# Patient Record
Sex: MALE
[De-identification: names, ages, dates, MRNs, and addresses within clinical notes are randomized per-mention and may not be internally consistent; named-entity substitution may affect disease eponyms.]

---

## 2018-09-19 ENCOUNTER — HOSPITAL ENCOUNTER (EMERGENCY)
Dept: HOSPITAL 88 - ER | Age: 33
Discharge: HOME | End: 2018-09-19
Payer: COMMERCIAL

## 2018-09-19 VITALS — WEIGHT: 290 LBS | HEIGHT: 70 IN | BODY MASS INDEX: 41.52 KG/M2

## 2018-09-19 VITALS — DIASTOLIC BLOOD PRESSURE: 73 MMHG | SYSTOLIC BLOOD PRESSURE: 123 MMHG

## 2018-09-19 DIAGNOSIS — R10.32: Primary | ICD-10-CM

## 2018-09-19 DIAGNOSIS — R10.84: ICD-10-CM

## 2018-09-19 LAB
ALBUMIN SERPL-MCNC: 4.1 G/DL (ref 3.5–5)
ALBUMIN/GLOB SERPL: 1.1 {RATIO} (ref 0.8–2)
ALP SERPL-CCNC: 79 IU/L (ref 40–150)
ALT SERPL-CCNC: 83 IU/L (ref 0–55)
ANION GAP SERPL CALC-SCNC: 15.9 MMOL/L (ref 8–16)
BACTERIA URNS QL MICRO: (no result) /HPF
BASOPHILS # BLD AUTO: 0.1 10*3/UL (ref 0–0.1)
BASOPHILS NFR BLD AUTO: 0.6 % (ref 0–1)
BILIRUB UR QL: NEGATIVE
BUN SERPL-MCNC: 13 MG/DL (ref 7–26)
BUN/CREAT SERPL: 12 (ref 6–25)
CALCIUM SERPL-MCNC: 9.8 MG/DL (ref 8.4–10.2)
CHLORIDE SERPL-SCNC: 104 MMOL/L (ref 98–107)
CK MB SERPL-MCNC: 0.8 NG/ML (ref 0–5)
CK SERPL-CCNC: 139 IU/L (ref 30–200)
CLARITY UR: (no result)
CO2 SERPL-SCNC: 24 MMOL/L (ref 22–29)
COLOR UR: YELLOW
DEPRECATED NEUTROPHILS # BLD AUTO: 7 10*3/UL (ref 2.1–6.9)
DEPRECATED RBC URNS MANUAL-ACNC: >50 /HPF (ref 0–5)
EGFRCR SERPLBLD CKD-EPI 2021: > 60 ML/MIN (ref 60–?)
EOSINOPHIL # BLD AUTO: 0.1 10*3/UL (ref 0–0.4)
EOSINOPHIL NFR BLD AUTO: 1.2 % (ref 0–6)
EPI CELLS URNS QL MICRO: (no result) /LPF
ERYTHROCYTE [DISTWIDTH] IN CORD BLOOD: 12.7 % (ref 11.7–14.4)
GLOBULIN PLAS-MCNC: 3.6 G/DL (ref 2.3–3.5)
GLUCOSE SERPLBLD-MCNC: 101 MG/DL (ref 74–118)
HCT VFR BLD AUTO: 42.1 % (ref 38.2–49.6)
HGB BLD-MCNC: 14.1 G/DL (ref 14–18)
HYALINE CASTS #/AREA URNS LPF: (no result) /[LPF] (ref 0–1)
KETONES UR QL STRIP.AUTO: NEGATIVE
LEUKOCYTE ESTERASE UR QL STRIP.AUTO: NEGATIVE
LIPASE SERPL-CCNC: 25 U/L (ref 8–78)
LYMPHOCYTES # BLD: 2.8 10*3/UL (ref 1–3.2)
LYMPHOCYTES NFR BLD AUTO: 25.9 % (ref 18–39.1)
MCH RBC QN AUTO: 29.7 PG (ref 28–32)
MCHC RBC AUTO-ENTMCNC: 33.5 G/DL (ref 31–35)
MCV RBC AUTO: 88.8 FL (ref 81–99)
MONOCYTES # BLD AUTO: 0.8 10*3/UL (ref 0.2–0.8)
MONOCYTES NFR BLD AUTO: 7.2 % (ref 4.4–11.3)
NEUTS SEG NFR BLD AUTO: 64.8 % (ref 38.7–80)
NITRITE UR QL STRIP.AUTO: NEGATIVE
PLATELET # BLD AUTO: 267 X10E3/UL (ref 140–360)
POTASSIUM SERPL-SCNC: 3.9 MMOL/L (ref 3.5–5.1)
PROT UR QL STRIP.AUTO: (no result)
RBC # BLD AUTO: 4.74 X10E6/UL (ref 4.3–5.7)
SODIUM SERPL-SCNC: 140 MMOL/L (ref 136–145)
SP GR UR STRIP: 1.03 (ref 1.01–1.02)
UROBILINOGEN UR STRIP-MCNC: 0.2 MG/DL (ref 0.2–1)
WBC #/AREA URNS HPF: (no result) /HPF (ref 0–5)

## 2018-09-19 PROCEDURE — 99284 EMERGENCY DEPT VISIT MOD MDM: CPT

## 2018-09-19 PROCEDURE — 82553 CREATINE MB FRACTION: CPT

## 2018-09-19 PROCEDURE — 83690 ASSAY OF LIPASE: CPT

## 2018-09-19 PROCEDURE — 36415 COLL VENOUS BLD VENIPUNCTURE: CPT

## 2018-09-19 PROCEDURE — 81001 URINALYSIS AUTO W/SCOPE: CPT

## 2018-09-19 PROCEDURE — 85025 COMPLETE CBC W/AUTO DIFF WBC: CPT

## 2018-09-19 PROCEDURE — 84484 ASSAY OF TROPONIN QUANT: CPT

## 2018-09-19 PROCEDURE — 74177 CT ABD & PELVIS W/CONTRAST: CPT

## 2018-09-19 PROCEDURE — 82550 ASSAY OF CK (CPK): CPT

## 2018-09-19 PROCEDURE — 80053 COMPREHEN METABOLIC PANEL: CPT

## 2018-09-19 NOTE — DIAGNOSTIC IMAGING REPORT
EXAMINATION: CT of the abdomen and pelvis with contrast.



TECHNIQUE: 

Helical CT images of the abdomen and pelvis were performed from the lung bases

to the lesser trochanters after the intravenous administration of 150 cc of

Isovue 300 and the oral administration of Gastroview.  Coronal and sagittal

reformatted images were obtained.



Dose modulation, iterative reconstruction, and/or weight based adjustment of

the mA/kV was utilized to reduce the radiation dose to as low as reasonably

achievable. 



COMPARISON:  None.



CLINICAL HISTORY:Left lower quadrant, severe pain

     

DISCUSSION:



ABDOMEN/PELVIS:



LOWER THORAX:Unremarkable.



HEPATOBILIARY: Hepatic steatosis. Focal calcification right lobe. No enhancing

lesion.  No intra-or extrahepatic biliary ductal dilation.  The gallbladder is

normal.   



SPLEEN: No splenomegaly.



PANCREAS: No focal masses or ductal dilatation. 



ADRENALS: No adrenal nodules.



KIDNEYS/URETERS: No hydronephrosis, stones, or solid mass lesions.



PELVIC ORGANS/BLADDER: The bladder is normal.  



PERITONEUM/RETROPERITONEUM: No free air or fluid.



LYMPH NODES: No intra-abdominal, retroperitoneal, pelvic or inguinal

lymphadenopathy.



VESSELS: Unremarkable.



GI TRACT: No distention or wall thickening. Appendix normal.



BONES AND SOFT TISSUE: No bony destructive lesions.    No soft tissue

abnormalities.  



IMPRESSION: 



No acute CT finding.



Hepatic steatosis.



Signed by: Dr. Andrew Palisch, M.D. on 9/19/2018 1:39 PM

## 2020-09-13 ENCOUNTER — HOSPITAL ENCOUNTER (EMERGENCY)
Dept: HOSPITAL 88 - ER | Age: 35
Discharge: HOME | End: 2020-09-13
Payer: SELF-PAY

## 2020-09-13 VITALS — BODY MASS INDEX: 41.52 KG/M2 | HEIGHT: 70 IN | WEIGHT: 290 LBS
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
## 2020-09-13 DIAGNOSIS — M54.16: Primary | ICD-10-CM

## 2020-09-13 PROCEDURE — 72192 CT PELVIS W/O DYE: CPT

## 2020-09-13 PROCEDURE — 99283 EMERGENCY DEPT VISIT LOW MDM: CPT

## 2020-09-13 PROCEDURE — 72131 CT LUMBAR SPINE W/O DYE: CPT

## 2020-09-13 NOTE — EMERGENCY DEPARTMENT NOTE
History of Present Illnes


History of Present Illness


Chief Complaint:  General Medicine Complaints


History of Present Illness


This is a 35 year old  male arrived to the ED with complaints of left 

sided back pain radiating down his back. 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 

heavy A/Cs for work. Pt denies any numbness or weakness in extremities


Historian:  Patient


Arrival Mode:  Car


Onset (how long ago):  hour(s)


Radiation:  Reports back


Severity:  moderate


Onset quality:  gradual


Duration (how long):  hour(s)


Timing of current episode:  constant


Progression:  waxing and waning


Chronicity:  recurrent





Past Medical/Family History


Physician Review


I have reviewed the patient's past medical and family history.  Any updates have

been documented here.





Past Medical History


Recent Fever:  No


Clinical Suspicion of Infectio:  No


New/Unexplained Change in Ment:  No


Past Medical History:  None, Kidney Stones


Past Surgical History:  None





Social History


Smoking Cessation:  Never Smoker


Counseling Performed:  No


Alcohol Use:  None


Any Illegal Drug Use:  No





Other


Last Tetanus:  utd


Any Pre-Existing Lines (PICC,:  No





Review of Systems


Review of Systems


Constitutional:  Reports no symptoms


EENTM:  Reports no symptoms


Cardiovascular:  Reports no symptoms


Respiratory:  Reports no symptoms


Gastrointestinal:  Reports no symptoms


Genitourinary:  Reports no symptoms


Musculoskeletal:  Reports as per HPI, Reports back pain


Integumentary:  Reports no symptoms


Neurological:  Reports no symptoms


Psychological:  Reports no symptoms


Endocrine:  Reports no symptoms


Hematological/Lymphatic:  Reports no symptoms





Physical Exam


Related Data


Allergies:  


Coded Allergies:  


     No Known Allergies (Unverified , 9/19/18)


Triage Vital Signs





Vital Signs








  Date Time  Temp Pulse Resp B/P (MAP) Pulse Ox O2 Delivery O2 Flow Rate FiO2


 


9/13/20 17:52 98.1 75 22 129/101 100 Room Air  








Vital signs reviewed:  Yes





Physical Exam


CONSTITUTIONAL





Constitutional:  Present well-developed, Present well-nourished


HENT


HENT:  Present normocephalic, Present atraumatic, Present oropharynx 

clear/moist, Present nose normal


HENT L/R:  Present left ext ear normal, Present right ext ear normal


EYES





Eyes:  Reports PERRL, Reports conjunctivae normal


NECK


Neck:  Present ROM normal


PULMONARY


Pulmonary:  Present effort normal, Present breath sounds normal


CARDIOVASCULAR





Cardiovascular:  Present regular rhythm, Present heart sounds normal, Present 

capillary refill normal, Present normal rate


GASTROINTESTINAL





Abdominal:  Present soft, Present nontender, Present bowel sounds normal


GENITOURINARY





Genitourinary:  Present exam deferred


SKIN


Skin:  Present warm, Present dry


MUSCULOSKELETAL





Musculoskeletal:  Present ROM normal, Present tenderness (+tenderness over left 

iliac crest and left lateral sacrum )


NEUROLOGICAL





Neurological:  Present alert, Present oriented x 3, Present no gross motor or s

ensory deficits


PSYCHOLOGICAL


Psychological:  Present mood/affect normal, Present judgement normal





Results


Imaging


Imaging results reviewed:  Yes


Impressions





IMPRESSION:





1.  Mildly degenerated disks at L4-5 and L5-S1.





2.  An approximately 1 cm left central/subarticular disc extrusion at L4-5,


which extends into the lateral recess of L5 and compresses the left L5 nerve


root correlate with the provided history of left leg pain.





3.  Otherwise, no significant abnormalities.





Assessment & Plan


Medical Decision Making


MDM


35-year-old male arrived to the ED with complaints of back pain, denies any 

trauma.  Lumbar CT findings concerning for significant stenosis which may be the

cause of patient's symptoms.  Patient noted relief with steroids, anti-

inflammatories and pain medication.  Patient ambulatory with steady gait and no 

neuro deficits.  No concerns epidural abscess, cauda equina or any other life-

threatening neurological process at time of discharge.





Assessment & Plan


Final Impression:  


(1) Lumbar radiculopathy


Depart Disposition:  HOME, SELF-CARE


Last Vital Signs











  Date Time  Temp Pulse Resp B/P (MAP) Pulse Ox O2 Delivery O2 Flow Rate FiO2


 


9/13/20 17:52 98.1 75 22 129/101 100 Room Air  








Home Meds


Active Scripts


Ketorolac Tromethamine (TORADOL) 10 Mg Tablet, 10 MG PO Q8HR, #14


   Prov:RAYNA CALHOUN, DO         9/13/20


Gabapentin (GABAPENTIN) 300 Mg Capsule, 300 MG PO BID, #20 CAP


   Prov:RAYNA CALHOUN, DO         9/13/20


Prednisone (PREDNISONE) 20 Mg Tab, 40 MG PO DAILY, #5 TAB


   Prov:RAYNA CALHOUN, DO         9/13/20


Methocarbamol (ROBAXIN-750) 750 Mg Tablet, 750 MG PO Q8HR PRN for MUSCLE SPASMS,

#14


   Prov:RAYNA CALHOUN, DO         9/13/20


Tamsulosin Hcl* (FLOMAX*) 0.4 Mg Cap, 0.4 MG PO DAILY, #10 CAP 0 Refills


   Prov:RAYNA CALHOUN DO         9/19/18


Tramadol Hcl (ULTRAM) 50 Mg Tablet, 50 MG PO Q6H PRN for PAIN for 10 Days, #14 

TAB


   Prov:RAYNA CALHOUN DO         9/19/18


Medications in the ED





Methylprednisolone Sodium Succinate 125 mg ONCE  ONCE IM ;  Start 9/13/20 at 

18:15;  Stop 9/13/20 at 18:16;  Status DC


Acetaminophen/ Hydrocodone Bitart 1 ea ONCE  ONCE PO ;  Start 9/13/20 at 19:00; 

Stop 9/13/20 at 19:01


Ketorolac Tromethamine 60 mg ONCE  ONCE IM ;  Start 9/13/20 at 19:00;  Stop 

9/13/20 at 19:01


Diazepam 5 mg ONCE PO ;  Start 9/13/20 at 18:45;  Stop 9/20/20 at 18:44;  Status

UNV











RAYNA CALHOUN DO            Sep 13, 2020 18:36

## 2020-09-13 NOTE — XMS REPORT
Continuity of Care Document

                             Created on: 2020



PERLITA HERNANDEZ

External Reference #: [id number]

: 1985

Sex: Male



Demographics





                          Address                   [address]

[place]  [address]

 

                          Home Phone                [phone]

 

                          Preferred Language        Unknown

 

                          Marital Status            Unknown

 

                          Oriental orthodox Affiliation     Unknown

 

                          Race                      Unknown

 

                          Additional Race(s)        Other



 

                          Ethnic Group              Unknown





Author





                          Author                    [facility]

t

 

                          Organization              [facility]

 

                          Address                   [address]

[place]  [address]



 

                          Phone                     Unavailable







Care Team Providers





                    Care Team Member Name Role                Phone

 

                    NO,  PCP            PCP                 Unavailable

 

                    SHRUTI BROWN    Attphys             Unavailable







Payers





           Payer Name Policy Type Policy Number Effective Date Expiration Date S

danny

 

           [employer] [place] [employer]            [id number]                       

[facility]







Problems

This patient has no known problems.



Allergies, Adverse Reactions, Alerts

This patient has no known allergies or adverse reactions.



Medications





             Ordered Medication Name Filled Medication Name Start Date   Stop Da

te    Current 

Medication? Ordering Clinician Indication Dosage     Frequency  Signature (SIG) 

Comments                  Components                Source

 

                    Tamsulosin Hcl (Flomax*) 0.4 Mg Cap Tamsulosin Hcl (Flomax*)

 0.4 Mg Cap 

2018 00:00:00        Yes    Tracey Brown Md        .4            Daily 

               [facility]

 

                    Tramadol Hcl (Ultram) 50 Mg Tablet Tramadol Hcl (Ultram) 50 

Mg Tablet 2018

00:00:00  2018 00:00:00 No        Tracey Brown Md           50         

         Every 6 Hours as 

needed for Pain                                             [facility]







Procedures





                Procedure       Date / Time Performed Performing Clinician [facility]

e

 

                    Computed tomography of abdomen and pelvis with contrast 2018 00:00:00 

TRACEY BROWN                        [facility]







Encounters





             Start Date/Time End Date/Time Encounter Type Admission Type Attendi

[facility] Facility   Care Department Encounter ID    Source

 

             2018 10:30:00 2018 15:44:00 Departed Emergency Room 1  

          TRACEY BROWN

                [facility]          [id number]    [facility]







Results





           Test Description Test Time  Test Comments Results    Result Comments 

Source

 

                CT ABDOMEN/PELVIS W 2018 13:33:00                     [facility] 

 [address] [address]      Patient Name: 
PERLITA HERNANDEZ   MR #: [id number]    : 1985 Age/Sex: 33/M  [facility] 
[id number] Req #: [id number]  Adm Physician:     Ordered by: TRACEY BROWN MD  Report #: [id number]   Location: ER  Room/Bed:     
______________________________________________________________________________
_____________________    Procedure: [id number] CT/CT ABDOMEN/PELVIS W  Exam Date:
18                            Exam Time: 1252       REPORT STATUS: Signed 
  EXAMINATION: CT of the abdomen and pelvis with contrast.      TECHNIQUE:    
Helical CT images of the abdomen and pelvis were performed from the lung bases  
to the lesser trochanters after the intravenous administration of 150 cc of   
Isovue 300 and the oral administration of Gastroview.  Coronal and sagittal   
reformatted images were obtained.      Dose modulation, iterative 
reconstruction, and/or weight based adjustment of   the mA/kV was utilized to 
reduce the radiation dose to as low as reasonably   achievable.       
COMPARISON:  None.      CLINICAL HISTORY:Left lower quadrant, severe pain       
   DISCUSSION:      ABDOMEN/PELVIS:      LOWER THORAX:Unremarkable.      
HEPATOBILIARY: Hepatic steatosis. Focal calcification right lobe. No enhancing  
lesion.  No intra-or extrahepatic biliary ductal dilation.  The gallbladder is  
normal.         SPLEEN: No splenomegaly.      PANCREAS: No focal masses or 
ductal dilatation.       ADRENALS: No adrenal nodules.      KIDNEYS/URETERS: No 
hydronephrosis, stones, or solid mass lesions.      PELVIC ORGANS/BLADDER: The 
bladder is normal.        PERITONEUM/RETROPERITONEUM: No free air or fluid.     
LYMPH NODES: No intra-abdominal, retroperitoneal, pelvic or inguinal   
lymphadenopathy.      VESSELS: Unremarkable.      GI TRACT: No distention or 
wall thickening. Appendix normal.      BONES AND SOFT TISSUE: No bony 
destructive lesions.    No soft tissue   abnormalities.        IMPRESSION:      
No acute CT finding.      Hepatic steatosis.      Signed by: Dr. Andrew Palisch,
M.D. on 2018 1:39 PM        Dictated By: ANDREW R PALISCH MD  
Electronically Signed By: ANDREW R PALISCH MD on 18  Transcribed By: 
JACKELYN on 189       COPY TO:   TRACEY BROWN MD                    

                

 

                    Urine WBC           2018 11:52:00   

 

                                        Test Item

 

             Urine WBC (test code = 5821-4) NONE         0-5                    

    





[facility] ZMK2506-37-22 11:52:00* 



             Test Item    Value        Reference Range Interpretation Comments

 

             Urine RBC (test code = 64227-9) 50-          0-5          H        

     





[facility] SouthUrine Bqaejwyc5201-34-44 11:52:00* 



             Test Item    Value        Reference Range Interpretation Comments

 

             Urine Bacteria (test code = 84850-4) RARE         NONE             

          





[facility] SouthU[facility] Epithelial Njxoz9746-15-22 11:52:00
  * 



             Test Item    Value        Reference Range Interpretation Comments

 

             Urine Epithelial Cells (test code = 07738-8) FEW          NONE     

                  





[facility] South[facility] Hyaline Zqpyp0872-25-84 11:52:00* 



             Test Item    Value        Reference Range Interpretation Comments

 

             Urine Hyaline Casts (test code = 09422-4) 0-1          0-1         

               





[facility] Jlhup5960-19-92 11:36:00* 



             Test Item    Value        Reference Range Interpretation Comments

 

             Sodium Level (test code = 2951-2) 140          136-145             

       





[facility] SouthPotassium Zeouo3214-50-46 11:36:00* 



             Test Item    Value        Reference Range Interpretation Comments

 

             Potassium Level (test code = 2823-3) 3.9          3.5-5.1          

          





[facility] SouthChloride Kszkf1296-43-35 11:36:00* 



             Test Item    Value        Reference Range Interpretation Comments

 

             Chloride Level (test code = 2075-0) 104                      

         





[facility] SouthCarbon Dioxide Jwgjs2461-49-13 11:36:00* 



             Test Item    Value        Reference Range Interpretation Comments

 

             Carbon Dioxide Level (test code = 2028-9) 24           22-29       

               





[facility] SouthAnion Fcl2331-32-22 11:36:00* 



             Test Item    Value        Reference Range Interpretation Comments

 

             Anion Gap (test code = 33037-3) 15.9         8-16                  

     





[facility] SouthBlood Urea Knljfiwh7224-03-87 11:36:00* 



             Test Item    Value        Reference Range Interpretation Comments

 

             Blood Urea Nitrogen (test code = 3094-0) 13           7-26         

              





[facility] SouthCreatinine2018-09-19 11:36:00* 



             Test Item    Value        Reference Range Interpretation Comments

 

             Creatinine (test code = 2160-0) 1.10         0.72-1.25             

     





[facility] SouthBUN/Creatinine Bxnaf2792-52-40 11:36:00* 



             Test Item    Value        Reference Range Interpretation Comments

 

             BUN/Creatinine Ratio (test code = 3097-3) 12                   

               





[facility] SouthEstimat Glomerular Filtration Rate
2018 11:36:00* 



             Test Item    Value        Reference Range Interpretation Comments

 

             Estimat Glomerular Filtration Rate (test code = 500929227) 60-     

     >60                        





Ranges were taken from the National Kidney Disease Education Program and the [facility] literature.Reference ranges:60 or greater: Wjfehn97-19 (
for 3 consecutive months): Chronic kidney disease 15 or less: Kidney failureShannon Medical Center SouthGlucose Uekhe3342-50-50 11:36:00* 



             Test Item    Value        Reference Range Interpretation Comments

 

             Glucose Level (test code = ADV2343) 101                      

         





[facility] SouthCalcium Pcmew0909-34-35 11:36:00* 



             Test Item    Value        Reference Range Interpretation Comments

 

             Calcium Level (test code = 62429-2) 9.8          8.4-10.2          

         





[facility] SouthTotal Mjpxgubdq1112-66-77 11:36:00* 



             Test Item    Value        Reference Range Interpretation Comments

 

             Total Bilirubin (test code = 1975-2) 0.4          0.2-1.2          

          





[facility] Center SouthAspartate Amino Transf (AST/SGOT)
2018 11:36:00* 



             Test Item    Value        Reference Range Interpretation Comments

 

                                        Aspartate Amino Transf (AST/SGOT) (test 

code = Aspartate Amino Transf 

(AST/SGOT))     40              5-34            H                





[facility] Center SouthAlanine Aminotransferase (ALT/SGPT)
2018 11:36:00* 



             Test Item    Value        Reference Range Interpretation Comments

 

             Alanine Aminotransferase (ALT/SGPT) (test code = 1742-6) 83        

   0-55         H             





[facility] SouthTotal Riolkgb7699-96-22 11:36:00* 



             Test Item    Value        Reference Range Interpretation Comments

 

             Total Protein (test code = 2885-2) 7.7          6.5-8.1            

        





[facility] SouthAlbumin2018-09-19 11:36:00* 



             Test Item    Value        Reference Range Interpretation Comments

 

             Albumin (test code = 1751-7) 4.1          3.5-5.0                  

  





[facility] SouthGlobulin2018-09-19 11:36:00* 



             Test Item    Value        Reference Range Interpretation Comments

 

             Globulin (test code = 20235-0) 3.6          2.3-3.5      H         

    





[facility] SouthAlbumin/Globulin Xvwru5202-09-74 11:36:00
  * 



             Test Item    Value        Reference Range Interpretation Comments

 

             Albumin/Globulin Ratio (test code = 1759-0) 1.1          0.8-2.0   

                 





[facility] SouthAlkaline Mohezhkixij2151-19-20 11:36:00* 



             Test Item    Value        Reference Range Interpretation Comments

 

             Alkaline Phosphatase (test code = 6768-6) 79                 

               





[facility] SouthCreatine Ugmhty5237-96-81 11:36:00* 



             Test Item    Value        Reference Range Interpretation Comments

 

             Creatine Kinase (test code = 2157-6) 139                     

          





[facility] SouthCreatine Kinase UJ8809-31-19 11:36:00* 



             Test Item    Value        Reference Range Interpretation Comments

 

             Creatine Kinase MB (test code = 98211-9) 0.80         0-5.0        

              





[facility] SouthTroponin -65-73 11:36:00* 



             Test Item    Value        Reference Range Interpretation Comments

 

             Troponin I (test code = QWX1967) 0.003        0-0.300              

      





[facility] SouthLipase2018-09-19 11:36:00* 



             Test Item    Value        Reference Range Interpretation Comments

 

             Lipase (test code = 3040-3) 25           8-78                      

 





[facility] SouthUrine Hktft7906-92-96 11:24:00* 



             Test Item    Value        Reference Range Interpretation Comments

 

             Urine Color (test code = 5778-6) YELLOW       YELLOW               

      





[facility] SouthUrine Vakxaki5525-52-66 11:24:00* 



             Test Item    Value        Reference Range Interpretation Comments

 

             Urine Clarity (test code = 33995-1) SL CLOUDY    CLEAR        H    

         





[facility] SouthUrine Specific Htuossx2032-63-63 11:24:00
  * 



             Test Item    Value        Reference Range Interpretation Comments

 

             Urine Specific Gravity (test code = 5811-5) 1.030        1.010-1.02

5  H             





[facility] SouthUrine uL0933-86-87 11:24:00* 



             Test Item    Value        Reference Range Interpretation Comments

 

             Urine pH (test code = 02563-9) 6            5-7                    

    





[facility] SouthUrine Leukocyte Bklskeix3962-01-85 
11:24:00* 



             Test Item    Value        Reference Range Interpretation Comments

 

             Urine Leukocyte Esterase (test code = 5799-2) NEGATIVE     NEGATIVE

                   





[facility] Mulwdff8464-47-79 11:24:00* 



             Test Item    Value        Reference Range Interpretation Comments

 

             Urine Nitrite (test code = 57726-1) NEGATIVE     NEGATIVE          

         





[facility] SouthUrine Iggmivg8732-50-27 11:24:00* 



             Test Item    Value        Reference Range Interpretation Comments

 

             Urine Protein (test code = 5804-0) TRACE        NEGATIVE     H     

        





[facility] SouthUrine Glucose (UA)2018 11:24:00* 



             Test Item    Value        Reference Range Interpretation Comments

 

             Urine Glucose (UA) (test code = 2349-9) NEGATIVE     NEGATIVE      

             





[facility] SouthUrine Gskvprd3787-77-62 11:24:00* 



             Test Item    Value        Reference Range Interpretation Comments

 

             Urine Ketones (test code = 45695-0) NEGATIVE     NEGATIVE          

         





[facility] SouthUrine Spoqmovzuapn3855-87-27 11:24:00* 



             Test Item    Value        Reference Range Interpretation Comments

 

             Urine Urobilinogen (test code = 79156-7) 0.2          0.2-1        

              





[facility] SouthUrine Zncysasci2813-65-47 11:24:00* 



             Test Item    Value        Reference Range Interpretation Comments

 

             Urine Bilirubin (test code = 1978-6) NEGATIVE     NEGATIVE         

          





[facility] SouthUrine Foign1924-54-46 11:24:00* 



             Test Item    Value        Reference Range Interpretation Comments

 

             Urine Blood (test code = 72287-3) 4+           NEGATIVE     H      

       





[facility] SouthWhite Blood Vtmsg0817-25-89 11:01:00* 



             Test Item    Value        Reference Range Interpretation Comments

 

             White Blood Count (test code = 6690-2) 10.78        4.8-10.8       

            





[facility] SouthRed Blood Qakdf7356-83-14 11:01:00* 



             Test Item    Value        Reference Range Interpretation Comments

 

             Red Blood Count (test code = 789-8) 4.74         4.3-5.7           

         





[facility] SouthHemoglobin2018-09-19 11:01:00* 



             Test Item    Value        Reference Range Interpretation Comments

 

             Hemoglobin (test code = 72258-4) 14.1         14.0-18.0            

      





[facility] SouthHematocrit2018-09-19 11:01:00* 



             Test Item    Value        Reference Range Interpretation Comments

 

             Hematocrit (test code = 4544-3) 42.1         38.2-49.6             

     





[facility] SouthMean Corpuscular Ctjneu2921-18-30 
11:01:00* 



             Test Item    Value        Reference Range Interpretation Comments

 

             Mean Corpuscular Volume (test code = 787-2) 88.8         81-99     

                 





[facility] SouthMean Corpuscular Blcazbmilo6061-55-78 
11:01:00* 



             Test Item    Value        Reference Range Interpretation Comments

 

             Mean Corpuscular Hemoglobin (test code = 785-6) 29.7         28-32 

                     





[facility] SouthMean Corpuscular Hemoglobin Concent
2018 11:01:00* 



             Test Item    Value        Reference Range Interpretation Comments

 

             Mean Corpuscular Hemoglobin Concent (test code = 786-4) 33.5       

  31-35                      





[facility] SouthRed Cell Distribution Xvmrw1376-07-77 
11:01:00* 



             Test Item    Value        Reference Range Interpretation Comments

 

             Red Cell Distribution Width (test code = 46980-1) 12.7         11.7

-14.4                  





[facility] SouthPlatelet Tqodc6205-21-18 11:01:00* 



             Test Item    Value        Reference Range Interpretation Comments

 

             Platelet Count (test code = 777-3) 267          140-360            

        





[facility] SouthNeutrophils (%) (Auto)2018 11:01:00
  * 



             Test Item    Value        Reference Range Interpretation Comments

 

             Neutrophils (%) (Auto) (test code = 54976-6) 64.8         38.7-80.0

                  





[facility] SouthLymphocytes (%) (Auto)2018 11:01:00
  * 



             Test Item    Value        Reference Range Interpretation Comments

 

             Lymphocytes (%) (Auto) (test code = 736-9) 25.9         18.0-39.1  

                





[facility] SouthMonocytes (%) (Auto)2018 11:01:00* 



             Test Item    Value        Reference Range Interpretation Comments

 

             Monocytes (%) (Auto) (test code = 5905-5) 7.2          4.4-11.3    

               





[facility] SouthEosinophils (%) (Auto)2018 11:01:00
  * 



             Test Item    Value        Reference Range Interpretation Comments

 

             Eosinophils (%) (Auto) (test code = 713-8) 1.2          0.0-6.0    

                





[facility] SouthBasophils (%) (Auto)2018 11:01:00* 



             Test Item    Value        Reference Range Interpretation Comments

 

             Basophils (%) (Auto) (test code = 706-2) 0.6          0.0-1.0      

              





[facility] SouthIM GRANULOCYTES %2018 11:01:00* 



             Test Item    Value        Reference Range Interpretation Comments

 

             IM GRANULOCYTES % (test code = IM GRANULOCYTES %) 0.3          0.0-

1.0                    





[facility] SouthNeutrophils # (Auto)2018 11:01:00* 



             Test Item    Value        Reference Range Interpretation Comments

 

             Neutrophils # (Auto) (test code = 751-8) 7.0          2.1-6.9      

H             





[facility] SouthLymphocytes # (Auto)2018 11:01:00* 



             Test Item    Value        Reference Range Interpretation Comments

 

             Lymphocytes # (Auto) (test code = 38660-4) 2.8          1.0-3.2    

                





[facility] SouthMonocytes # (Auto)2018 11:01:00* 



             Test Item    Value        Reference Range Interpretation Comments

 

             Monocytes # (Auto) (test code = 742-7) 0.8          0.2-0.8        

            





[facility] SouthEosinophils # (Auto)2018 11:01:00* 



             Test Item    Value        Reference Range Interpretation Comments

 

             Eosinophils # (Auto) (test code = 711-2) 0.1          0.0-0.4      

              





[facility] SouthBasophils # (Auto)2018 11:01:00* 



             Test Item    Value        Reference Range Interpretation Comments

 

             Basophils # (Auto) (test code = 704-7) 0.1          0.0-0.1        

            





[facility] SouthAbsolute Immature Granulocyte (auto
2018 11:01:00* 



             Test Item    Value        Reference Range Interpretation Comments

 

                                        Absolute Immature Granulocyte (auto (ashwini

t code = Absolute Immature Granulocyte 

(auto)          0.03            0-0.1                            





[facility]

## 2020-09-13 NOTE — DIAGNOSTIC IMAGING REPORT
EXAM: CT Pelvis WITHOUT contrast  

INDICATION:      Back pain 

COMPARISON: None.

TECHNIQUE: Pelvis were scanned utilizing a multidetector helical scanner from

the iliac crest to the pubic symphysis without administration of IV contrast.

Coronal and sagittal reformations were obtained. Routine protocol was

performed. 

     IV CONTRAST: None

     ORAL CONTRAST: None

            

COMPLICATIONS: None



RADIATION DOSE:

     Total DLP: ... 1134 mGy*cm

     Estimated effective dose: (DLP x 0.015 x size factor) mSv

     CTDIvol has been reviewed. It is below the limits set by the Radiation

Protocol Committee (RPC).

     Dose modulation, iterative reconstruction, and/or weight based adjustment

of the mA/kV was utilized to reduce the radiation dose to as low as reasonably

achievable. 



FINDINGS:



LINES and TUBES: None.



GI TRACT: No abnormal distention, wall thickening, or evidence of bowel

obstruction.          



PELVIC ORGANS/BLADDER: Unremarkable.



LYMPH NODES: No lymphadenopathy.



VESSELS: Unremarkable.



PERITONEUM / RETROPERITONEUM: No free air or fluid.



BONES: Mild degenerative changes in the included lower lumbosacral spine, with

L4-L5 disc protrusion.



SOFT TISSUES: Small fat-containing right inguinal hernia.            



IMPRESSION: 



Mild degenerative changes in the included lower lumbosacral spine, with L4-L5

disc protrusion.



Signed by: Cristhian Ceja DO on 9/13/2020 7:37 PM

## 2020-09-13 NOTE — DIAGNOSTIC IMAGING REPORT
History: Left back pain

Comparison studies: None



Technique: 

Axial images were obtained from inferior T11 through superior S4.

Coronal and sagittal images reconstructed from the axial data.

Dose modulation, iterative reconstruction, and/or weight based adjustment 

of the mA/kV was utilized to reduce the radiation dose to as low as reasonably 

achievable.



Intravenous contrast: None



Findings:



Number of non-rib bearing vertebral bodies: 5



Alignment: Slight straightening of the usual lordosis.  No scoliosis.

Soft tissues: No abnormalities.

Paraspinal muscles: Unremarkable.



Vertebrae:  

No fractures, infection or neoplasm.



Degenerative changes:



L1-L2:

No abnormalities



L2-L3:

Follow-up right asymmetric disc bulge.

Otherwise, no abnormalities.



L3-L4:

No significant spinal canal or foraminal stenosis in spite of asymmetric disc

bulge.

Otherwise no abnormalities.



L4-L5:

Mildly degenerated disc.

An approximately 1 cm left central and subarticular disc extrusion, which

extends into the left L5 lateral recess and compresses the left L5 nerve root

and results in at least moderate spinal canal stenosis. (Series 4, images 75

through 80, series 503, image 30). Mild superimposed bilateral foraminal

stenosis due to a symmetric disc bulge.



L5-S1:

Mildly degenerated disc.

Patent spinal canal and foramina. No disc herniation.



Sacroiliac joints: 

No degenerative changes.



IMPRESSION:



1.  Mildly degenerated disks at L4-5 and L5-S1.



2.  An approximately 1 cm left central/subarticular disc extrusion at L4-5,

which extends into the lateral recess of L5 and compresses the left L5 nerve

root correlate with the provided history of left leg pain.



3.  Otherwise, no significant abnormalities.



Signed by: Dr. Sven Alexis M.D. on 9/13/2020 7:31 PM